# Patient Record
Sex: FEMALE | Race: WHITE | NOT HISPANIC OR LATINO | Employment: OTHER | URBAN - METROPOLITAN AREA
[De-identification: names, ages, dates, MRNs, and addresses within clinical notes are randomized per-mention and may not be internally consistent; named-entity substitution may affect disease eponyms.]

---

## 2019-07-20 ENCOUNTER — HOSPITAL ENCOUNTER (EMERGENCY)
Facility: MEDICAL CENTER | Age: 64
End: 2019-07-20
Attending: EMERGENCY MEDICINE
Payer: COMMERCIAL

## 2019-07-20 ENCOUNTER — APPOINTMENT (OUTPATIENT)
Dept: RADIOLOGY | Facility: MEDICAL CENTER | Age: 64
End: 2019-07-20
Attending: EMERGENCY MEDICINE
Payer: COMMERCIAL

## 2019-07-20 VITALS
RESPIRATION RATE: 17 BRPM | TEMPERATURE: 98.4 F | HEIGHT: 63 IN | WEIGHT: 135.58 LBS | BODY MASS INDEX: 24.02 KG/M2 | SYSTOLIC BLOOD PRESSURE: 159 MMHG | DIASTOLIC BLOOD PRESSURE: 99 MMHG | OXYGEN SATURATION: 97 % | HEART RATE: 81 BPM

## 2019-07-20 DIAGNOSIS — S09.90XA CLOSED HEAD INJURY, INITIAL ENCOUNTER: ICD-10-CM

## 2019-07-20 DIAGNOSIS — G96.08 SUBDURAL HYGROMA: ICD-10-CM

## 2019-07-20 PROCEDURE — 70450 CT HEAD/BRAIN W/O DYE: CPT

## 2019-07-20 PROCEDURE — 99283 EMERGENCY DEPT VISIT LOW MDM: CPT

## 2019-07-20 PROCEDURE — 70486 CT MAXILLOFACIAL W/O DYE: CPT

## 2019-07-21 NOTE — ED TRIAGE NOTES
"Chief Complaint   Patient presents with   • T-5000 Head Injury     baseball hit her on the left side of head.      /99   Pulse (!) 108   Temp 36.9 °C (98.4 °F) (Temporal)   Resp 17   Ht 1.6 m (5' 3\")   Wt 61.5 kg (135 lb 9.3 oz)   SpO2 97%   BMI 24.02 kg/m²       Pt got hit in the head by a baseball at [place] [employer] game, \"[employer]\". Pt denies LOC, family states she was in shock for a couple of seconds.     Pt states she lost hearing for a couple of seconds. Pt is A&O 4. Denies n/v    Does not take blood thinners.     Pt back out to lobby, asked to notify staff of any changes.  "

## 2019-07-21 NOTE — ED NOTES
Discharge instructions discussed with pt and . Verbalized understanding. Pt discharged ambulatory.

## 2019-07-21 NOTE — ED PROVIDER NOTES
"ED Provider Note    Scribed for Barry Smith M.D. by Julienne Gardner. 7/20/2019, 8:58 PM.    Primary care provider: Chau Saldana M.D.  Means of arrival: Walk-in  History obtained from: Patient  History limited by: None    CHIEF COMPLAINT  Chief Complaint   Patient presents with   • T-5000 Head Injury     baseball hit her on the left side of head.        JO Santo is a 64 y.o. female who presents to the Emergency Department status post head injury. The patient was sitting at a baseball game today when she was struck on the left side of the face with a baseball. She experienced approximately 40 seconds of left hearing before the hearing returned and denies any loss of consciousness or vomiting. She additionally complains of constant left facial numbness. There are no alleviating or exacerbating factors. She is not anticoagulated.     REVIEW OF SYSTEMS  Pertinent positives include head injury, resolved hearing loss, and left facial numbness. Pertinent negatives include no loss of consciousness or vomiting.  All other systems reviewed and negative.    PAST MEDICAL HISTORY   has a past medical history of Hyperlipemia and No known health problems.    SURGICAL HISTORY   has a past surgical history that includes other; other; and other.    SOCIAL HISTORY  Social History   Substance Use Topics   • Smoking status: Never Smoker   • Alcohol use Yes      Comment: occ      History   Drug Use No       FAMILY HISTORY  No family history noted     CURRENT MEDICATIONS  No current facility-administered medications for this encounter.     Current Outpatient Prescriptions:   •  SIMVASTATIN PO, Take 5 mg by mouth., Disp: , Rfl:     ALLERGIES  Allergies   Allergen Reactions   • Amoxicillin Rash   • Codeine Vomiting   • Hydrocodone Vomiting   • Percocet [Perloxx]        PHYSICAL EXAM  VITAL SIGNS: /99   Pulse (!) 108   Temp 36.9 °C (98.4 °F) (Temporal)   Resp 17   Ht 1.6 m (5' 3\")   Wt 61.5 kg (135 lb 9.3 oz)   SpO2 97%   BMI " 24.02 kg/m²     Constitutional: Well developed, Well nourished, No acute distress, Non-toxic appearance.   HENT: Swelling to the left face and left ear with tiny abrasion to the left ear, tenderness to the left zygomatic arch. No maocclusion or mandibular tenderness. Normocephalic, TMs normal, mucous membranes moist, no erythema, exudates, swelling, or masses, nares patent  Eyes: nonicteric, EOMI grossly intact without evidence of occular trauma.   Neck: Supple, no meningismus. No C-spine tenderness.   Lymphatic: No lymphadenopathy noted.   Cardiovascular: Borderline tachycardia, Regular rhythm, no gallops rubs or murmurs  Lungs: Clear bilaterally   Abdomen: Bowel sounds normal, Soft, No tenderness  Skin: Warm, Dry, no rash  Back: No tenderness, No CVA tenderness.   Genitalia: Deferred  Rectal: Deferred  Extremities: No edema  Neurologic: Alert, appropriate, follows commands, moving all extremities, normal speech. Sensation deficit to the left face.   Psychiatric: Affect normal    DIAGNOSTIC STUDIES / PROCEDURES    RADIOLOGY  CT-MAXILLOFACIAL W/O PLUS RECONS   Final Result      Negative maxillofacial CT scan without contrast.      CT-HEAD W/O   Final Result      1.  No acute intracranial abnormality.   2.  Small chronic left frontal subdural hygroma imparting mild mass effect with minimal frontal shift of the septum pellucidum left to right.               INTERPRETING LOCATION:  [address] [place] [address], [facility]      The radiologist's interpretation of all radiological studies have been reviewed by me.    COURSE & MEDICAL DECISION MAKING  Nursing notes, VS, PMSFHx reviewed in chart.     8:58 PM Patient seen and examined at bedside. Ordered for Ct head and CT-maxillofacial to evaluate.     10:36 PM Reviewed the patient's imaging results, which shows a chronic subdural hygroma. These results were discussed with the patient at bedside. I have recommended follow up with neurology regarding the CT finding. Otherwise, I  recommended ice therapy to the injured area as well a Tylenol/Motrin for pain relief. The patient will be discharged and should return if symptoms worsen or if new symptoms arise. The patient understands and agrees to plan.      Decision Making:  This is a 64 y.o. year old female who presents with what appears to be a minor head injury.  She was hit in the head with a baseball on the left side.  There is no evidence of facial fracture and no evidence of acute bleed in her head.  She does appear to have a subdural hygroma that appears chronic-I will refer her to neurosurgery in regards to this but it does not appear related to her injury today.  Patient will be given head injury precautions.    The patient will return for new or worsening symptoms and is stable at the time of discharge.    The patient is referred to a primary physician for blood pressure management, diabetic screening, and for all other preventative health concerns.    DISPOSITION:  Patient will be discharged home in stable condition.    FOLLOW UP:  Naeem Heard M.D.  [address]  [address]  [facility] [address]  [phone]    Schedule an appointment as soon as possible for a visit today        FINAL IMPRESSION  1. Closed head injury, initial encounter    2. Subdural hygroma          Julienne TAPIA (Shaniquaibtammy), am scribing for, and in the presence of, Barry Smith M.D..    Electronically signed by: Julienne Gardner (Shaniquaibtammy), 7/20/2019    Barry TAPIA M.D. personally performed the services described in this documentation, as scribed by Julienne Gardner in my presence, and it is both accurate and complete. C.     The note accurately reflects work and decisions made by me.  Barry Smith  7/20/2019  10:48 PM

## 2019-08-15 ENCOUNTER — APPOINTMENT (OUTPATIENT)
Dept: RADIOLOGY | Facility: MEDICAL CENTER | Age: 64
End: 2019-08-15
Attending: CLINICAL NURSE SPECIALIST
Payer: COMMERCIAL

## 2019-08-15 DIAGNOSIS — D18.1 LYMPHANGIOMA, ANY SITE: ICD-10-CM

## 2019-08-15 PROCEDURE — 70551 MRI BRAIN STEM W/O DYE: CPT

## 2019-09-27 ENCOUNTER — APPOINTMENT (OUTPATIENT)
Dept: RADIOLOGY | Facility: MEDICAL CENTER | Age: 64
End: 2019-09-27
Attending: CLINICAL NURSE SPECIALIST
Payer: COMMERCIAL

## 2019-09-28 ENCOUNTER — HOSPITAL ENCOUNTER (OUTPATIENT)
Dept: RADIOLOGY | Facility: MEDICAL CENTER | Age: 64
End: 2019-09-28
Attending: CLINICAL NURSE SPECIALIST
Payer: COMMERCIAL

## 2019-09-28 DIAGNOSIS — D18.1 LYMPHANGIOMA, ANY SITE: ICD-10-CM

## 2019-09-28 PROCEDURE — 70450 CT HEAD/BRAIN W/O DYE: CPT

## 2019-12-30 ENCOUNTER — HOSPITAL ENCOUNTER (OUTPATIENT)
Dept: RADIOLOGY | Facility: MEDICAL CENTER | Age: 64
End: 2019-12-30
Attending: NEUROLOGICAL SURGERY
Payer: COMMERCIAL

## 2019-12-30 DIAGNOSIS — D18.1 LYMPHANGIOMA, ANY SITE: ICD-10-CM

## 2019-12-30 PROCEDURE — 70450 CT HEAD/BRAIN W/O DYE: CPT
